# Patient Record
Sex: FEMALE | Race: WHITE | NOT HISPANIC OR LATINO | Employment: FULL TIME | URBAN - METROPOLITAN AREA
[De-identification: names, ages, dates, MRNs, and addresses within clinical notes are randomized per-mention and may not be internally consistent; named-entity substitution may affect disease eponyms.]

---

## 2024-08-23 ENCOUNTER — OFFICE VISIT (OUTPATIENT)
Dept: FAMILY MEDICINE CLINIC | Facility: CLINIC | Age: 24
End: 2024-08-23
Payer: COMMERCIAL

## 2024-08-23 ENCOUNTER — PATIENT ROUNDING (BHMG ONLY) (OUTPATIENT)
Dept: FAMILY MEDICINE CLINIC | Facility: CLINIC | Age: 24
End: 2024-08-23
Payer: COMMERCIAL

## 2024-08-23 VITALS
RESPIRATION RATE: 18 BRPM | HEIGHT: 70 IN | BODY MASS INDEX: 36.51 KG/M2 | SYSTOLIC BLOOD PRESSURE: 118 MMHG | HEART RATE: 111 BPM | OXYGEN SATURATION: 98 % | DIASTOLIC BLOOD PRESSURE: 90 MMHG | WEIGHT: 255 LBS

## 2024-08-23 DIAGNOSIS — Z13.228 ENCOUNTER FOR SCREENING FOR OTHER METABOLIC DISORDERS: ICD-10-CM

## 2024-08-23 DIAGNOSIS — Z11.59 NEED FOR HEPATITIS C SCREENING TEST: ICD-10-CM

## 2024-08-23 DIAGNOSIS — Z13.220 SCREENING FOR HYPERLIPIDEMIA: ICD-10-CM

## 2024-08-23 DIAGNOSIS — Z00.00 ANNUAL PHYSICAL EXAM: Primary | ICD-10-CM

## 2024-08-23 DIAGNOSIS — F41.9 ANXIETY: ICD-10-CM

## 2024-08-23 PROCEDURE — 99385 PREV VISIT NEW AGE 18-39: CPT | Performed by: NURSE PRACTITIONER

## 2024-08-23 RX ORDER — PROPRANOLOL HYDROCHLORIDE 10 MG/1
10 TABLET ORAL 2 TIMES DAILY
Qty: 60 TABLET | Refills: 1 | Status: SHIPPED | OUTPATIENT
Start: 2024-08-23

## 2024-08-23 RX ORDER — NORETHINDRONE ACETATE AND ETHINYL ESTRADIOL 1.5; 3 MG/1; UG/1
1 TABLET ORAL DAILY
Qty: 21 EACH | Refills: 11 | Status: SHIPPED | OUTPATIENT
Start: 2024-08-23

## 2024-08-23 RX ORDER — DESVENLAFAXINE 25 MG/1
TABLET, EXTENDED RELEASE ORAL DAILY
COMMUNITY
End: 2024-08-23 | Stop reason: SDUPTHER

## 2024-08-23 RX ORDER — PROPRANOLOL HYDROCHLORIDE 10 MG/1
10 TABLET ORAL 2 TIMES DAILY
COMMUNITY
End: 2024-08-23 | Stop reason: SDUPTHER

## 2024-08-23 RX ORDER — NORETHINDRONE ACETATE AND ETHINYL ESTRADIOL 1.5; 3 MG/1; UG/1
1 TABLET ORAL DAILY
COMMUNITY
Start: 2024-06-14 | End: 2024-08-23 | Stop reason: SDUPTHER

## 2024-08-23 RX ORDER — DESVENLAFAXINE 25 MG/1
25 TABLET, EXTENDED RELEASE ORAL DAILY
Qty: 90 TABLET | Refills: 1 | Status: SHIPPED | OUTPATIENT
Start: 2024-08-23

## 2024-08-23 NOTE — PATIENT INSTRUCTIONS
I will call call or send [employer] message with your lab results.   Please call with any questions or concerns.    Return in about 1 year (around 8/23/2025) for Annual, Labs.

## 2024-08-23 NOTE — PROGRESS NOTES
A [employer] message has been sent to the patient for PATIENT ROUNDING with [facility] [place].

## 2024-08-23 NOTE — PROGRESS NOTES
Preventive Exam    History of Present Illness: Zita Birmingham is a 23 y.o. here for check up and review of routine health maintenance. he states he is doing well and has no concerns.    Patient is new to me and new to our office. PMHX of ADHD, anxiety, anemia.     Past medical history, surgical history and family history have been reviewed.     Review of Systems   Constitutional:  Negative for appetite change, chills, fatigue and fever.   HENT:  Negative for sinus pressure and sore throat.    Eyes: Negative.    Respiratory:  Negative for cough, chest tightness, shortness of breath and wheezing.    Cardiovascular:  Negative for chest pain, palpitations and leg swelling.   Gastrointestinal:  Negative for abdominal pain, constipation, diarrhea, nausea and vomiting.   Endocrine: Negative.  Negative for cold intolerance and heat intolerance.   Genitourinary: Negative.  Negative for breast discharge, breast lump, breast pain, frequency, menstrual problem and pelvic pain.   Musculoskeletal:  Negative for arthralgias, back pain, joint swelling and myalgias.   Skin: Negative.    Allergic/Immunologic: Negative.  Negative for environmental allergies and food allergies.   Neurological:  Negative for dizziness, weakness, numbness and headache.   Hematological: Negative.  Does not bruise/bleed easily.   Psychiatric/Behavioral:  Negative for behavioral problems, suicidal ideas, depressed mood and stress. The patient is nervous/anxious.        PHYSICAL EXAM    Vitals:    08/23/24 0913   BP: 118/90   Pulse: 111   Resp: 18   SpO2: 98%       Body mass index is 36.59 kg/m².   Class 2 Severe Obesity (BMI >=35 and <=39.9). Obesity-related health conditions include the following: none. Obesity is unchanged. BMI is is above average; BMI management plan is completed. We discussed portion control and increasing exercise.       Physical Exam  Vitals and nursing note reviewed.   Constitutional:       Appearance: Normal appearance. He is  well-developed. He is obese.   HENT:      Head: Normocephalic and atraumatic.      Right Ear: Tympanic membrane, ear canal and external ear normal.      Left Ear: Tympanic membrane, ear canal and external ear normal.      Nose: Nose normal.      Mouth/Throat:      Lips: Pink.      Mouth: Mucous membranes are moist.      Tongue: No lesions.      Palate: No mass and lesions.      Pharynx: Oropharynx is clear. Uvula midline.      Tonsils: No tonsillar exudate.   Eyes:      Conjunctiva/sclera: Conjunctivae normal.      Pupils: Pupils are equal, round, and reactive to light.   Neck:      Thyroid: No thyromegaly.   Cardiovascular:      Rate and Rhythm: Normal rate and regular rhythm.      Pulses: Normal pulses.           Dorsalis pedis pulses are 2+ on the right side and 2+ on the left side.        Posterior tibial pulses are 2+ on the right side and 2+ on the left side.      Heart sounds: Normal heart sounds. No murmur heard.  Pulmonary:      Effort: Pulmonary effort is normal.      Breath sounds: Normal breath sounds.   Abdominal:      General: Bowel sounds are normal. There is no distension.      Palpations: Abdomen is soft.      Tenderness: There is no abdominal tenderness.   Musculoskeletal:         General: No deformity. Normal range of motion.      Cervical back: Normal range of motion and neck supple.      Right lower leg: No edema.      Left lower leg: No edema.   Lymphadenopathy:      Head:      Right side of head: No submental, submandibular, tonsillar, preauricular, posterior auricular or occipital adenopathy.      Left side of head: No submental, submandibular, tonsillar, preauricular, posterior auricular or occipital adenopathy.      Cervical: No cervical adenopathy.      Right cervical: No superficial, deep or posterior cervical adenopathy.     Left cervical: No superficial, deep or posterior cervical adenopathy.      Upper Body:      Right upper body: No supraclavicular adenopathy.      Left upper body: No  supraclavicular adenopathy.   Skin:     General: Skin is warm and dry.      Capillary Refill: Capillary refill takes 2 to 3 seconds.   Neurological:      General: No focal deficit present.      Mental Status: He is alert and oriented to person, place, and time.      Cranial Nerves: No cranial nerve deficit.      Sensory: Sensation is intact.      Motor: Motor function is intact.      Coordination: Coordination is intact.      Gait: Gait is intact.   Psychiatric:         Attention and Perception: Attention and perception normal.         Mood and Affect: Mood and affect normal.         Speech: Speech normal.         Behavior: Behavior normal. Behavior is cooperative.         Thought Content: Thought content normal.         Cognition and Memory: Cognition and memory normal.         Judgment: Judgment normal.         Procedures    Diagnoses and all orders for this visit:    1. Annual physical exam (Primary)    2. Need for hepatitis C screening test  -     Hepatitis C antibody    3. Encounter for screening for other metabolic disorders  -     CBC & Differential  -     Comprehensive Metabolic Panel    4. Screening for hyperlipidemia  -     Lipid Panel With / Chol / HDL Ratio    5. Anxiety  -     Desvenlafaxine Succinate ER 25 MG tablet sustained-release 24 hour; Take 1 tablet by mouth Daily.  Dispense: 90 tablet; Refill: 1  -     propranolol (INDERAL) 10 MG tablet; Take 1 tablet by mouth 2 (Two) Times a Day.  Dispense: 60 tablet; Refill: 1    Other orders  -     Junel 1.5/30 1.5-30 MG-MCG tablet; Take 1 tablet by mouth Daily.  Dispense: 21 each; Refill: 11        Problems Addressed this Visit    None  Visit Diagnoses       Annual physical exam    -  Primary    Need for hepatitis C screening test        Relevant Orders    Hepatitis C antibody    Encounter for screening for other metabolic disorders        Relevant Orders    CBC & Differential    Comprehensive Metabolic Panel    Screening for hyperlipidemia        Relevant  Orders    Lipid Panel With / Chol / HDL Ratio    Anxiety        Relevant Medications    Desvenlafaxine Succinate ER 25 MG tablet sustained-release 24 hour    propranolol (INDERAL) 10 MG tablet          Diagnoses         Codes Comments    Annual physical exam    -  Primary ICD-10-CM: Z00.00  ICD-9-CM: V70.0     Need for hepatitis C screening test     ICD-10-CM: Z11.59  ICD-9-CM: V73.89     Encounter for screening for other metabolic disorders     ICD-10-CM: Z13.228  ICD-9-CM: V77.99     Screening for hyperlipidemia     ICD-10-CM: Z13.220  ICD-9-CM: V77.91     Anxiety     ICD-10-CM: F41.9  ICD-9-CM: 300.00           Lipid panel  CBC  CMP  Hepatitis C Antibody  Refill of Junel  Refill of desvenlafaxine  Propranolol 10 mg refill  Routine health maintenance reviewed and discussed with Zita Birmingham.    Preventative counseling regarding healthy diet and exercise.   Pt reports that he wears a seatbelt regularly.   Return in about 1 year (around 8/23/2025) for Annual, Labs.

## 2024-08-24 LAB
ALBUMIN SERPL-MCNC: 4.2 G/DL (ref 4–5)
ALP SERPL-CCNC: 72 IU/L (ref 44–121)
ALT SERPL-CCNC: 13 IU/L (ref 0–32)
AST SERPL-CCNC: 17 IU/L (ref 0–40)
BASOPHILS # BLD AUTO: 0.1 X10E3/UL (ref 0–0.2)
BASOPHILS NFR BLD AUTO: 1 %
BILIRUB SERPL-MCNC: 0.4 MG/DL (ref 0–1.2)
BUN SERPL-MCNC: 13 MG/DL (ref 6–20)
BUN/CREAT SERPL: 14 (ref 9–23)
CALCIUM SERPL-MCNC: 9.1 MG/DL (ref 8.7–10.2)
CHLORIDE SERPL-SCNC: 103 MMOL/L (ref 96–106)
CHOLEST SERPL-MCNC: 169 MG/DL (ref 100–199)
CHOLEST/HDLC SERPL: 3.4 RATIO (ref 0–4.4)
CO2 SERPL-SCNC: 23 MMOL/L (ref 20–29)
CREAT SERPL-MCNC: 0.94 MG/DL (ref 0.57–1)
EGFRCR SERPLBLD CKD-EPI 2021: 87 ML/MIN/1.73
EOSINOPHIL # BLD AUTO: 0.1 X10E3/UL (ref 0–0.4)
EOSINOPHIL NFR BLD AUTO: 1 %
ERYTHROCYTE [DISTWIDTH] IN BLOOD BY AUTOMATED COUNT: 12 % (ref 11.7–15.4)
GLOBULIN SER CALC-MCNC: 2.8 G/DL (ref 1.5–4.5)
GLUCOSE SERPL-MCNC: 82 MG/DL (ref 70–99)
HCT VFR BLD AUTO: 47.4 % (ref 34–46.6)
HCV IGG SERPL QL IA: NON REACTIVE
HDLC SERPL-MCNC: 49 MG/DL
HGB BLD-MCNC: 15.3 G/DL (ref 11.1–15.9)
IMM GRANULOCYTES # BLD AUTO: 0 X10E3/UL (ref 0–0.1)
IMM GRANULOCYTES NFR BLD AUTO: 0 %
LDLC SERPL CALC-MCNC: 100 MG/DL (ref 0–99)
LYMPHOCYTES # BLD AUTO: 1.3 X10E3/UL (ref 0.7–3.1)
LYMPHOCYTES NFR BLD AUTO: 21 %
MCH RBC QN AUTO: 27.7 PG (ref 26.6–33)
MCHC RBC AUTO-ENTMCNC: 32.3 G/DL (ref 31.5–35.7)
MCV RBC AUTO: 86 FL (ref 79–97)
MONOCYTES # BLD AUTO: 0.6 X10E3/UL (ref 0.1–0.9)
MONOCYTES NFR BLD AUTO: 9 %
NEUTROPHILS # BLD AUTO: 4.1 X10E3/UL (ref 1.4–7)
NEUTROPHILS NFR BLD AUTO: 68 %
PLATELET # BLD AUTO: 318 X10E3/UL (ref 150–450)
POTASSIUM SERPL-SCNC: 4.1 MMOL/L (ref 3.5–5.2)
PROT SERPL-MCNC: 7 G/DL (ref 6–8.5)
RBC # BLD AUTO: 5.53 X10E6/UL (ref 3.77–5.28)
SODIUM SERPL-SCNC: 140 MMOL/L (ref 134–144)
TRIGL SERPL-MCNC: 108 MG/DL (ref 0–149)
VLDLC SERPL CALC-MCNC: 20 MG/DL (ref 5–40)
WBC # BLD AUTO: 6.1 X10E3/UL (ref 3.4–10.8)

## 2025-03-29 DIAGNOSIS — F41.9 ANXIETY: ICD-10-CM

## 2025-03-31 RX ORDER — DESVENLAFAXINE 25 MG/1
25 TABLET, EXTENDED RELEASE ORAL DAILY
Qty: 90 TABLET | Refills: 1 | Status: SHIPPED | OUTPATIENT
Start: 2025-03-31

## 2025-04-04 ENCOUNTER — OFFICE VISIT (OUTPATIENT)
Dept: FAMILY MEDICINE CLINIC | Facility: CLINIC | Age: 25
End: 2025-04-04
Payer: COMMERCIAL

## 2025-04-04 VITALS
SYSTOLIC BLOOD PRESSURE: 122 MMHG | WEIGHT: 263 LBS | OXYGEN SATURATION: 97 % | BODY MASS INDEX: 37.65 KG/M2 | DIASTOLIC BLOOD PRESSURE: 88 MMHG | HEART RATE: 95 BPM | HEIGHT: 70 IN | RESPIRATION RATE: 18 BRPM

## 2025-04-04 DIAGNOSIS — Z80.3 FAMILY HISTORY OF BREAST CANCER: ICD-10-CM

## 2025-04-04 DIAGNOSIS — R92.30 DENSE BREASTS: ICD-10-CM

## 2025-04-04 DIAGNOSIS — Z12.4 SCREENING FOR MALIGNANT NEOPLASM OF CERVIX: Primary | ICD-10-CM

## 2025-04-04 NOTE — PROGRESS NOTES
Subjective   Zita Birmingham is a 24 y.o. adult.     No chief complaint on file.       History of Present Illness     History of Present Illness  The patient is a 24-year-old female who presents for a Pap smear and breast exam.    She has previously undergone a Pap smear and expresses a desire to have it performed during this visit. She reports no vaginal discharge or bleeding. She identifies as bisexual and has no history of sexual intercourse with males.    She has identified dense areas of tissue in her breasts, which she finds concerning. Her mother was diagnosed with breast cancer in her 40s and was treated with hormone blockers for several years until she underwent a hysterectomy. The patient's mother continued hormone blocker therapy post-hysterectomy until multiple screenings indicated stability. The patient is aware that her family does not carry the BRCA gene.    FAMILY HISTORY  Her mother had breast cancer in her 40s.       The following portions of the patient's history were reviewed and updated as appropriate: allergies, current medications, past family history, past medical history, past social history, past surgical history and problem list.    Review of Systems   Constitutional:  Negative for chills, fatigue and fever.   Respiratory:  Negative for cough, chest tightness, shortness of breath and wheezing.    Cardiovascular:  Negative for chest pain, palpitations and leg swelling.   Genitourinary:  Negative for breast discharge, decreased urine volume, dysuria, hematuria and urinary incontinence.        Denies vaginal discharge or bleeding. Dense breast tissue.   Neurological:  Negative for dizziness and headache.   Hematological: Negative.    Psychiatric/Behavioral: Negative.  Negative for sleep disturbance.        Objective   Physical Exam  Vitals and nursing note reviewed. Exam conducted with a chaperone present.   Constitutional:       Appearance: He is well-developed.   HENT:      Head:  Normocephalic and atraumatic.   Eyes:      Conjunctiva/sclera: Conjunctivae normal.      Pupils: Pupils are equal, round, and reactive to light.   Cardiovascular:      Rate and Rhythm: Normal rate and regular rhythm.      Heart sounds: Normal heart sounds. No murmur heard.  Pulmonary:      Effort: Pulmonary effort is normal.      Breath sounds: Normal breath sounds.   Chest:   Breasts:     Breasts are symmetrical.      Right: No swelling, bleeding, inverted nipple, mass, nipple discharge, skin change or tenderness.      Left: No swelling, bleeding, inverted nipple, mass, nipple discharge, skin change or tenderness.      Comments: Breasts are large. Fibrous tissue noted bilaterally in breasts. Left breast seems a little more dense than right with palpation.   Genitourinary:     Comments: Patient here for pap. Biologically is female. Cervix is normal. External vagina normal. No rashes, tenderness or abnormalities noted.   Neurological:      Mental Status: He is alert and oriented to person, place, and time.   Psychiatric:         Behavior: Behavior normal.         Thought Content: Thought content normal.         Judgment: Judgment normal.         Vitals:    04/04/25 1111   BP: 122/88   Pulse: 95   Resp: 18   SpO2: 97%     Body mass index is 37.74 kg/m².      Procedures    Assessment & Plan   Problems Addressed this Visit    None  Visit Diagnoses         Screening for malignant neoplasm of cervix    -  Primary    Relevant Orders    IGP, Rfx Aptima HPV ASCU      Dense breasts          Family history of breast cancer              Diagnoses         Codes Comments      Screening for malignant neoplasm of cervix    -  Primary ICD-10-CM: Z12.4  ICD-9-CM: V76.2       Dense breasts     ICD-10-CM: R92.30  ICD-9-CM: 793.82       Family history of breast cancer     ICD-10-CM: Z80.3  ICD-9-CM: V16.3             Assessment & Plan  1. Health maintenance.  She is here for a routine Pap smear and breast exam. She has expressed anxiety  about the procedure but prefers to have it done today. A Pap smear will be conducted during this visit.    2. Breast cancer screening.  She has expressed concerns about dense breast tissue and a family history of breast cancer, as her mother was diagnosed in her 40s. She does not have the BRCA gene, as confirmed by previous evaluations. A referral to a breast surgeon for further evaluation and potential diagnostic imaging will be made to ensure comprehensive assessment and peace of mind.            Return if symptoms worsen or fail to improve.    Patient or patient representative verbalized consent for the use of [employer] during the visit with  LASHAWN Cesar for chart documentation. 4/4/2025  11:40 EDT

## 2025-04-09 ENCOUNTER — TELEPHONE (OUTPATIENT)
Dept: FAMILY MEDICINE CLINIC | Facility: CLINIC | Age: 25
End: 2025-04-09

## 2025-04-09 LAB
CONV .: NORMAL
CYTOLOGIST CVX/VAG CYTO: NORMAL
CYTOLOGY CVX/VAG DOC CYTO: NORMAL
CYTOLOGY CVX/VAG DOC THIN PREP: NORMAL
DX ICD CODE: NORMAL
OTHER STN SPEC: NORMAL
SERVICE CMNT-IMP: NORMAL
STAT OF ADQ CVX/VAG CYTO-IMP: NORMAL

## 2025-04-09 NOTE — TELEPHONE ENCOUNTER
"  Caller: Zita Birmingham \"Lane\"    Relationship: Self    Best call back number:     [phone]       What was the call regarding:     PATIENT WAS TOLD AT HER LAST APPOINTMENT THAT A REFERRAL WAS GOING TO BE PUT IN FOR HER AND SHE IS WANTING TO KNOW THE STATUS ON THE REFERRAL.  THE REFERRAL WAS TO BE FOR A BREAST SURGEON.    ALSO SHE STATED THAT SHE HAD A PAP SMEAR AND IS WANTING TO KNOW IF THE RESULTS OF THAT HAS CAME IN.    PLEASE CALL PATIENT BACK AND ADVISE.    "

## 2025-04-10 DIAGNOSIS — R92.30 DENSE BREAST TISSUE: ICD-10-CM

## 2025-04-10 DIAGNOSIS — Z80.3 FAMILY HISTORY OF BREAST CANCER: Primary | ICD-10-CM

## 2025-04-23 ENCOUNTER — TELEPHONE (OUTPATIENT)
Dept: FAMILY MEDICINE CLINIC | Facility: CLINIC | Age: 25
End: 2025-04-23

## 2025-04-23 NOTE — TELEPHONE ENCOUNTER
"  Caller: Zita Birmingham \"Lane\"    Relationship: Self    Best call back number: [phone]    What was the call regarding: PATIENT CALLED TO GET PHONE NUMBER FOR HER REFERRAL FOR HIGH RISK BREAST CLINIC. HUB COULD NOT LOCATE NUMBER, PLEASE CALL BACK ASAP WITH PHONE NUMBER AND ADDRESS.   "

## 2025-04-25 ENCOUNTER — TELEPHONE (OUTPATIENT)
Dept: SURGERY | Facility: CLINIC | Age: 25
End: 2025-04-25
Payer: COMMERCIAL

## 2025-04-25 NOTE — TELEPHONE ENCOUNTER
New patient appointment scheduled with ANITA Rodriges    5/23/2025 @ 9:30 am Arrival 15 minutes prior to appointment time.     New patient packet mailed. She expressed v/u of appointment date and time.

## 2025-05-23 ENCOUNTER — OFFICE VISIT (OUTPATIENT)
Dept: SURGERY | Facility: CLINIC | Age: 25
End: 2025-05-23
Payer: COMMERCIAL

## 2025-05-23 VITALS
BODY MASS INDEX: 38.8 KG/M2 | WEIGHT: 271 LBS | HEART RATE: 76 BPM | SYSTOLIC BLOOD PRESSURE: 134 MMHG | DIASTOLIC BLOOD PRESSURE: 82 MMHG | HEIGHT: 70 IN | OXYGEN SATURATION: 98 %

## 2025-05-23 DIAGNOSIS — Z91.89 AT HIGH RISK FOR BREAST CANCER: Primary | ICD-10-CM

## 2025-05-23 DIAGNOSIS — Z80.3 FAMILY HISTORY OF BREAST CANCER: ICD-10-CM

## 2025-05-23 NOTE — LETTER
May 23, 2025     LASHAWN Cesar  [address]  [facility] [address]    Patient: Zita Birmingham   YOB: 2000   Date of Visit: 5/23/2025     Dear LASHAWN Cesar:       Thank you for referring Zita Birmingham to me for evaluation. Below are the relevant portions of my assessment and plan of care.    If you have questions, please do not hesitate to call me. I look forward to following Zita along with you.         Sincerely,        LASHAWN Ross        CC: No Recipients    Zahira Padilla APRN  05/23/25 1017  [facility]     Referring Provider: LASHAWN Cesar     Chief complaint: family history breast cancer     Subjective   HPI: Ms. Zita Birmingham is a 23 yo patient, seen at the request of LASHAWN Cesar, for family history of breast cancer and evaluation for high risk screening.  Patient wears a binder daily.  Patient is interested in the future breast reduction/top surgery.    Denies any personal history of breast imaging/biopsies.  She has a family history of breast cancer, mother at age 45  She denies any breast lumps, pain, skin changes, or nipple discharge.       Review of Systems   Constitutional:  Negative for fever and unexpected weight change.   Respiratory:  Negative for cough.    Musculoskeletal: Negative.    Neurological:  Negative for headaches.   Psychiatric/Behavioral:  Positive for sleep disturbance. The patient is nervous/anxious.    All other systems reviewed and are negative.      Medications:    Current Outpatient Medications:     Desvenlafaxine Succinate ER 25 MG tablet sustained-release 24 hour, TAKE 1 TABLET BY MOUTH EVERY DAY, Disp: 90 tablet, Rfl: 1    Junel 1.5/30 1.5-30 MG-MCG tablet, Take 1 tablet by mouth Daily., Disp: 21 each, Rfl: 11    propranolol (INDERAL) 10 MG tablet, Take 1 tablet by mouth 2 (Two) Times a Day., Disp: 60 tablet, Rfl: 1    Allergies:  No Known Allergies    Medical history:  Past  Medical History:   Diagnosis Date    ADHD (attention deficit hyperactivity disorder)     Anemia     Anxiety     Arthritis     Low back pain        Surgical History:  History reviewed. No pertinent surgical history.    Family History:  Family History   Problem Relation Age of Onset    Cancer Mother 45        Hormonal Breat Cancer    Breast cancer Mother     Anxiety disorder Mother     Hypertension Father     ADD / ADHD Brother     Dementia Paternal Grandmother        Social History:   Social History     Socioeconomic History    Marital status: Single   Tobacco Use    Smoking status: Never    Smokeless tobacco: Never   Vaping Use    Vaping status: Never Used   Substance and Sexual Activity    Alcohol use: Yes     Comment: very rarely    Drug use: Yes     Frequency: 2.0 times per week     Types: Marijuana     Comment: occasional gummy    Sexual activity: Yes     Partners: Female     Birth control/protection: Birth control pill, Partner of same sex     Patient does not drink caffeine.    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 P: 0. AB: 0.  Last menstrual period: Current  Age at menarche: 12  Age at first childbirth: N/A  Lactation/How long: N/A  Age at menopause: N/A  Total years of oral contraceptive use: 7  Total years of hormone replacement therapy: 0    Objective  Physical Exam  Vitals:    25 0948   BP: 134/82   Pulse: 76   SpO2: 98%     ECOG 0 - Asymptomatic  General: NAD, well appearing  Psych: a&o x 3, anxious  Lymph nodes: no cervical, supraclavicular or axillary lymphadenopathy  Breast: symmetric, large, bra size 40K (unsure)   Right: No visible abnormalities on inspection while seated, with arms raised or hands on hips. No masses, skin changes, or nipple abnormalities.  Left: No visible abnormalities on inspection while seated, with arms raised or hands on hips. No masses, skin changes, or nipple abnormalities.    Imaging/Pathology:       Assessment &  Plan  Assessment:    Increased lifetime risk of breast cancer-according to TC 8 she has a lifetime risk of 30.6% (calculated 5/23/2025)  Family history of breast cancer    Discussion:  We discussed management options for individuals who are at increased risk (>20% lifetime risk):  1) High risk screening - Annual mammogram and annual breast MRI, alternating one test every 6 months, biannual clinical exam and monthly self breast exam. He meets criteria for high risk screening.  2) Chemoprevention with Tamoxifen, Raloxifene or Exemestane. These may reduce risk up to 50%. Not discussed at todays visit.   3) Risk reducing surgery such as prophylactic mastectomy  which may reduce risk by 90-95%. We discussed that this is a relatively radical strategy and is generally reserved for individuals with a known genetic mutation predisposing them to an increased risk (~50% risk) of breast cancer. He does not meet criteria for risk reducing surgery.   4) I discussed the importance of exercise and weight management as part of a risk reducing strategy, since increased BMI is associated with an increased risk of breast cancer.     Discussed risk and benefit with starting MRIs now versus waiting till patient is 29.  Would like to go ahead and start this as patient is very anxious due to risk of breast cancer.  We can further discuss next year if this will continue annually or we can get a baseline this year and restarted 29.  Patient verbalized understanding agree with plan.    Plan:  -6 month clinic exam  -MRI now, will call with results.  -annual mammogram starting at 29.     I have advised the patient to continue monthly breast self examination and advised to notify us if she develops new breast symptoms.      LASHAWN Ross    I spent 30 total minutes, face-to-face, chart review and caring for Zita marx. This time includes time spent by me in the following activities: preparing for the visit, performing a medically  appropriate examination and/or evaluation, counseling and educating the patient/family/caregiver, ordering medications, tests, or procedures, documenting information in the medical record and independently interpreting results and communicating that information with the patient/family/caregiver.    CC:  LASHAWN Cesar      EMR Dragon/transcription disclaimer:  Dictated using Dragon dictation     Zahira PadillaLASHAWN  05/23/25 0906  Sign when Signing Visit  [facility]     Referring Provider: LASHAWN Cesar     Chief complaint: family history breast cancer     Subjective   HPI: Ms. Zita Birmingham is a 25 yo woman, seen at the request of LASHAWN Cesar, for family history of breast cancer and evaluation for high risk screening.     She has a personal history of         She has a family history of breast cancer in her. Mother age 45      Today she presents with   She denies any breast lumps, pain, skin changes, or nipple discharge.  She denies any prior history of abnormal mammograms or breast biopsies.       Review of Systems   Psychiatric/Behavioral:  Positive for sleep disturbance. The patient is nervous/anxious.    All other systems reviewed and are negative.      Medications:    Current Outpatient Medications:     Desvenlafaxine Succinate ER 25 MG tablet sustained-release 24 hour, TAKE 1 TABLET BY MOUTH EVERY DAY, Disp: 90 tablet, Rfl: 1    Junel 1.5/30 1.5-30 MG-MCG tablet, Take 1 tablet by mouth Daily., Disp: 21 each, Rfl: 11    propranolol (INDERAL) 10 MG tablet, Take 1 tablet by mouth 2 (Two) Times a Day., Disp: 60 tablet, Rfl: 1    Allergies:  No Known Allergies    Medical history:  Past Medical History:   Diagnosis Date    ADHD (attention deficit hyperactivity disorder)     Anemia     Anxiety     Arthritis     Low back pain        Surgical History:  History reviewed. No pertinent surgical history.    Family History:  Family History   Problem Relation Age of Onset     Cancer Mother 45        Hormonal Breat Cancer    Breast cancer Mother     Anxiety disorder Mother     Hypertension Father     ADD / ADHD Brother     Dementia Paternal Grandmother        Social History:   Social History     Socioeconomic History    Marital status: Single   Tobacco Use    Smoking status: Never    Smokeless tobacco: Never   Vaping Use    Vaping status: Never Used   Substance and Sexual Activity    Alcohol use: Yes     Comment: very rarely    Drug use: Yes     Frequency: 2.0 times per week     Types: Marijuana     Comment: occasional gummy    Sexual activity: Yes     Partners: Female     Birth control/protection: Birth control pill, Partner of same sex     Patient does not drink caffeine.    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 P: 0. AB: 0.  Last menstrual period: Current  Age at menarche: 12  Age at first childbirth: N/A  Lactation/How long: N/A  Age at menopause: N/A  Total years of oral contraceptive use: 7  Total years of hormone replacement therapy: 0    Objective  Physical Exam  Vitals:    25 0948   BP: 134/82   Pulse: 76   SpO2: 98%     ECOG {performance status:36058}  General: NAD, well appearing  Psych: a&o x 3, normal mood and affect  Lymph nodes: no cervical, supraclavicular or axillary lymphadenopathy  Breast: symmetric,   Right: No visible abnormalities on inspection while seated, with arms raised or hands on hips. No masses, skin changes, or nipple abnormalities.  Left: No visible abnormalities on inspection while seated, with arms raised or hands on hips. No masses, skin changes, or nipple abnormalities.    Imaging/Pathology:       Assessment & Plan  Assessment:    Increased lifetime risk of breast cancer-according to TC 8 she has a lifetime risk of....  Family history of breast cancer    Discussion:      Plan:  -6 month clinic exam  -annual mammogram and MRI starting at 29.     I have advised the patient to continue monthly  breast self examination and advised to notify us if she develops new breast symptoms.      Marianna Singh MA    I spent *** total minutes, face-to-face, chart review and caring for Zita today. This time includes time spent by me in the following activities: preparing for the visit, performing a medically appropriate examination and/or evaluation, counseling and educating the patient/family/caregiver, ordering medications, tests, or procedures, documenting information in the medical record and independently interpreting results and communicating that information with the patient/family/caregiver.    CC:  LASHAWN Cesar Jennifer J, APRN  @[email or web address]@    EMR Dragon/transcription disclaimer:  Dictated using Dragon dictation

## 2025-05-23 NOTE — PROGRESS NOTES
[facility]     Referring Provider: LASHAWN Cesar     Chief complaint: family history breast cancer     Subjective    HPI: Lane Birmingham is a 23 yo patient, seen at the request of LASHAWN Cesar, for family history of breast cancer and evaluation for high risk screening.  Patient wears a binder daily.  Patient is interested in the future breast reduction/top surgery.    Denies any personal history of breast imaging/biopsies.  He has a family history of breast cancer, mother at age 45  He denies any breast lumps, pain, skin changes, or nipple discharge.       Review of Systems   Constitutional:  Negative for fever and unexpected weight change.   Respiratory:  Negative for cough.    Musculoskeletal: Negative.    Neurological:  Negative for headaches.   Psychiatric/Behavioral:  Positive for sleep disturbance. The patient is nervous/anxious.    All other systems reviewed and are negative.      Medications:    Current Outpatient Medications:     Desvenlafaxine Succinate ER 25 MG tablet sustained-release 24 hour, TAKE 1 TABLET BY MOUTH EVERY DAY, Disp: 90 tablet, Rfl: 1    Junel 1.5/30 1.5-30 MG-MCG tablet, Take 1 tablet by mouth Daily., Disp: 21 each, Rfl: 11    propranolol (INDERAL) 10 MG tablet, Take 1 tablet by mouth 2 (Two) Times a Day., Disp: 60 tablet, Rfl: 1    Allergies:  No Known Allergies    Medical history:  Past Medical History:   Diagnosis Date    ADHD (attention deficit hyperactivity disorder)     Anemia     Anxiety     Arthritis     Low back pain        Surgical History:  History reviewed. No pertinent surgical history.    Family History:  Family History   Problem Relation Age of Onset    Cancer Mother 45        Hormonal Breat Cancer    Breast cancer Mother     Anxiety disorder Mother     Hypertension Father     ADD / ADHD Brother     Dementia Paternal Grandmother        Social History:   Social History     Socioeconomic History    Marital status: Single   Tobacco Use    Smoking  status: Never    Smokeless tobacco: Never   Vaping Use    Vaping status: Never Used   Substance and Sexual Activity    Alcohol use: Yes     Comment: very rarely    Drug use: Yes     Frequency: 2.0 times per week     Types: Marijuana     Comment: occasional gummy    Sexual activity: Yes     Partners: Female     Birth control/protection: Birth control pill, Partner of same sex     Patient does not drink caffeine.    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 P: 0. AB: 0.  Last menstrual period: Current  Age at menarche: 12  Age at first childbirth: N/A  Lactation/How long: N/A  Age at menopause: N/A  Total years of oral contraceptive use: 7  Total years of hormone replacement therapy: 0  Patient is not on any hormone gender confirming medication.     Objective   Physical Exam  Vitals:    25 0948   BP: 134/82   Pulse: 76   SpO2: 98%     ECOG 0 - Asymptomatic  General: NAD, well appearing  Psych: a&o x 3, anxious  Lymph nodes: no cervical, supraclavicular or axillary lymphadenopathy  Breast: symmetric, large, bra size 40K (pt unsure)   Right: No visible abnormalities on inspection while seated, with arms raised or hands on hips. No masses, skin changes, or nipple abnormalities.  Left: No visible abnormalities on inspection while seated, with arms raised or hands on hips. No masses, skin changes, or nipple abnormalities.    Imaging/Pathology:   No imaging    Assessment & Plan   Assessment:    Increased lifetime risk of breast cancer-according to TC 8 he has a lifetime risk of 30.6% (calculated 2025)  Family history of breast cancer    Discussion:  We discussed management options for individuals who are at increased risk (>20% lifetime risk):  1) High risk screening - Annual mammogram and annual breast MRI, alternating one test every 6 months, biannual clinical exam and monthly self breast exam. He meets criteria for high risk screening.  2) Chemoprevention with  Tamoxifen, Raloxifene or Exemestane. These may reduce risk up to 50%. Not discussed at todays visit.   3) Risk reducing surgery such as prophylactic mastectomy, we discussed that this is a relatively radical strategy and is generally reserved for individuals with a known genetic mutation predisposing them to an increased risk of breast cancer. He does not meet this criteria.   4) We dicussed modifiable risk reducing modifications including: exercise, BMI within normal range, minimize alcohol intake, and not smoking.    Discussed risk and benefit with starting MRIs now versus waiting till patient is 29.  Would like to go ahead and start this as patient is very anxious due to risk of breast cancer.  We can further discuss next year if this will continue annually or we can get a baseline this year and restart at 29.  Patient verbalized understanding agree with plan.    Plan:  -6 month clinic exam  -MRI now, will call with results.  -annual mammogram starting at 29.     I have advised the patient to continue monthly breast self examination and advised to notify us if she develops new breast symptoms.    Case reviewed with Dr. Morfin.       LASHAWN Ross    I spent 45 total minutes, face-to-face, chart review and caring for Zita marx. This time includes time spent by me in the following activities: preparing for the visit, performing a medically appropriate examination and/or evaluation, counseling and educating the patient/family/caregiver, ordering medications, tests, or procedures, documenting information in the medical record and independently interpreting results and communicating that information with the patient/family/caregiver.    CC:  LASHAWN Cesar      EMR Dragon/transcription disclaimer:  Dictated using Dragon dictation

## 2025-05-23 NOTE — LETTER
May 23, 2025     LASHAWN Cesar  [address]  [facility] [address]    Patient: Zita Birmingham   YOB: 2000   Date of Visit: 5/23/2025     Dear LASHAWN Cesar:       Thank you for referring Zita Birmingham to me for evaluation. Below are the relevant portions of my assessment and plan of care.    If you have questions, please do not hesitate to call me. I look forward to following Zita along with you.         Sincerely,        LASHAWN Ross        CC: No Recipients    Zahira Padilla APRN  05/23/25 1213  Sign when Signing Visit  [facility]     Referring Provider: LASHAWN Cesar     Chief complaint: family history breast cancer     Subjective   HPI: Lane Birmingham is a 25 yo patient, seen at the request of LASHAWN Cesar, for family history of breast cancer and evaluation for high risk screening.  Patient wears a binder daily.  Patient is interested in the future breast reduction/top surgery.    Denies any personal history of breast imaging/biopsies.  He has a family history of breast cancer, mother at age 45  He denies any breast lumps, pain, skin changes, or nipple discharge.       Review of Systems   Constitutional:  Negative for fever and unexpected weight change.   Respiratory:  Negative for cough.    Musculoskeletal: Negative.    Neurological:  Negative for headaches.   Psychiatric/Behavioral:  Positive for sleep disturbance. The patient is nervous/anxious.    All other systems reviewed and are negative.      Medications:    Current Outpatient Medications:   •  Desvenlafaxine Succinate ER 25 MG tablet sustained-release 24 hour, TAKE 1 TABLET BY MOUTH EVERY DAY, Disp: 90 tablet, Rfl: 1  •  Junel 1.5/30 1.5-30 MG-MCG tablet, Take 1 tablet by mouth Daily., Disp: 21 each, Rfl: 11  •  propranolol (INDERAL) 10 MG tablet, Take 1 tablet by mouth 2 (Two) Times a Day., Disp: 60 tablet, Rfl: 1    Allergies:  No Known Allergies    Medical  history:  Past Medical History:   Diagnosis Date   • ADHD (attention deficit hyperactivity disorder)    • Anemia    • Anxiety    • Arthritis    • Low back pain        Surgical History:  History reviewed. No pertinent surgical history.    Family History:  Family History   Problem Relation Age of Onset   • Cancer Mother 45        Hormonal Breat Cancer   • Breast cancer Mother    • Anxiety disorder Mother    • Hypertension Father    • ADD / ADHD Brother    • Dementia Paternal Grandmother        Social History:   Social History     Socioeconomic History   • Marital status: Single   Tobacco Use   • Smoking status: Never   • Smokeless tobacco: Never   Vaping Use   • Vaping status: Never Used   Substance and Sexual Activity   • Alcohol use: Yes     Comment: very rarely   • Drug use: Yes     Frequency: 2.0 times per week     Types: Marijuana     Comment: occasional gummy   • Sexual activity: Yes     Partners: Female     Birth control/protection: Birth control pill, Partner of same sex     Patient does not drink caffeine.    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 P: 0. AB: 0.  Last menstrual period: Current  Age at menarche: 12  Age at first childbirth: N/A  Lactation/How long: N/A  Age at menopause: N/A  Total years of oral contraceptive use: 7  Total years of hormone replacement therapy: 0  Patient is not on any hormone gender confirming medication.     Objective  Physical Exam  Vitals:    25 0948   BP: 134/82   Pulse: 76   SpO2: 98%     ECOG 0 - Asymptomatic  General: NAD, well appearing  Psych: a&o x 3, anxious  Lymph nodes: no cervical, supraclavicular or axillary lymphadenopathy  Breast: symmetric, large, bra size 40K (pt unsure)   Right: No visible abnormalities on inspection while seated, with arms raised or hands on hips. No masses, skin changes, or nipple abnormalities.  Left: No visible abnormalities on inspection while seated, with arms raised or hands on  hips. No masses, skin changes, or nipple abnormalities.    Imaging/Pathology:   No imaging    Assessment & Plan  Assessment:    Increased lifetime risk of breast cancer-according to TC 8 he has a lifetime risk of 30.6% (calculated 5/23/2025)  Family history of breast cancer    Discussion:  We discussed management options for individuals who are at increased risk (>20% lifetime risk):  1) High risk screening - Annual mammogram and annual breast MRI, alternating one test every 6 months, biannual clinical exam and monthly self breast exam. He meets criteria for high risk screening.  2) Chemoprevention with Tamoxifen, Raloxifene or Exemestane. These may reduce risk up to 50%. Not discussed at todays visit.   3) Risk reducing surgery such as prophylactic mastectomy, we discussed that this is a relatively radical strategy and is generally reserved for individuals with a known genetic mutation predisposing them to an increased risk of breast cancer. He does not meet this criteria.   4) We dicussed modifiable risk reducing modifications including: exercise, BMI within normal range, minimize alcohol intake, and not smoking.    Discussed risk and benefit with starting MRIs now versus waiting till patient is 29.  Would like to go ahead and start this as patient is very anxious due to risk of breast cancer.  We can further discuss next year if this will continue annually or we can get a baseline this year and restart at 29.  Patient verbalized understanding agree with plan.    Plan:  -6 month clinic exam  -MRI now, will call with results.  -annual mammogram starting at 29.     I have advised the patient to continue monthly breast self examination and advised to notify us if she develops new breast symptoms.    Case reviewed with Dr. Morfin.       LASHAWN Ross    I spent 45 total minutes, face-to-face, chart review and caring for Zita marx. This time includes time spent by me in the following activities: preparing for  the visit, performing a medically appropriate examination and/or evaluation, counseling and educating the patient/family/caregiver, ordering medications, tests, or procedures, documenting information in the medical record and independently interpreting results and communicating that information with the patient/family/caregiver.    CC:  LASHAWN Cesar      EMR Dragon/transcription disclaimer:  Dictated using Dragon dictation

## 2025-06-13 ENCOUNTER — TELEPHONE (OUTPATIENT)
Dept: SURGERY | Facility: CLINIC | Age: 25
End: 2025-06-13
Payer: COMMERCIAL

## 2025-06-13 NOTE — TELEPHONE ENCOUNTER
"  Caller: Zita Birmingham \"Lane\"    Relationship to patient: Self    Best call back number: [phone]    Patient is needing: PATIENT ASKS FOR MRI ORDER TO BE SENT TO [facility]  "

## 2025-06-16 NOTE — TELEPHONE ENCOUNTER
Pt called today to ask why her imaging has not been moved to [facility].  I let her know that we like to make sure that pts are aware that if there is a finding which requires a biopsy at [facility], they do not perform biopsies so her imaging would need to be repeated at [facility].    Pt said she did not mind and to move the appt to [facility].   Appt is now at [facility] on 6/24/2025 7:45 a.m arrival.     [facility]    [phone] Referral has been authed and faxed.

## 2025-06-30 ENCOUNTER — TELEPHONE (OUTPATIENT)
Dept: SURGERY | Facility: CLINIC | Age: 25
End: 2025-06-30
Payer: COMMERCIAL

## 2025-06-30 NOTE — TELEPHONE ENCOUNTER
I called patient and let her the following.      ----- Message from Zahira Padilla sent at 6/30/2025  3:17 PM EDT -----  Please let patient know that his MRI was normal. We will continue with follow up in October.